# Patient Record
Sex: MALE | Race: WHITE | URBAN - METROPOLITAN AREA
[De-identification: names, ages, dates, MRNs, and addresses within clinical notes are randomized per-mention and may not be internally consistent; named-entity substitution may affect disease eponyms.]

---

## 2019-11-12 ENCOUNTER — TELEPHONE (OUTPATIENT)
Dept: NEUROLOGY | Facility: CLINIC | Age: 24
End: 2019-11-12

## 2024-07-05 ENCOUNTER — HOSPITAL ENCOUNTER (EMERGENCY)
Facility: HOSPITAL | Age: 29
Discharge: HOME | End: 2024-07-05
Attending: EMERGENCY MEDICINE
Payer: COMMERCIAL

## 2024-07-05 VITALS
HEART RATE: 67 BPM | TEMPERATURE: 98.7 F | OXYGEN SATURATION: 98 % | RESPIRATION RATE: 18 BRPM | DIASTOLIC BLOOD PRESSURE: 68 MMHG | SYSTOLIC BLOOD PRESSURE: 118 MMHG
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
## 2024-07-05 PROCEDURE — 99283 EMERGENCY DEPT VISIT LOW MDM: CPT

## 2024-07-05 PROCEDURE — 99284 EMERGENCY DEPT VISIT MOD MDM: CPT | Performed by: EMERGENCY MEDICINE

## 2024-07-05 RX ORDER — AMOXICILLIN AND CLAVULANATE POTASSIUM 875; 125 MG/1; MG/1
1 TABLET, FILM COATED ORAL EVERY 12 HOURS
Qty: 14 TABLET | Refills: 0 | Status: SHIPPED | OUTPATIENT
Start: 2024-07-05 | End: 2024-07-12

## 2024-07-05 RX ORDER — GINSENG 100 MG
1 CAPSULE ORAL ONCE
Status: COMPLETED | OUTPATIENT
Start: 2024-07-05 | End: 2024-07-05

## 2024-07-05 RX ORDER — GINSENG 100 MG
1 CAPSULE ORAL 2 TIMES DAILY
Qty: 28 G | Refills: 0 | Status: SHIPPED | OUTPATIENT
Start: 2024-07-05

## 2024-07-05 RX ADMIN — BACITRACIN 1 SMALL APPLICATION: 500 OINTMENT TOPICAL at 20:55

## 2024-07-06 NOTE — ED PROVIDER NOTES
History  Chief Complaint   Patient presents with    Dog Bite     Pt in way of dog bite. Laceration noted to left wrist and two puncture wounds to left hand     29-year-old male, presents with dog bites to left hand.  Patient was breaking up a fight between his and family members dog, was accidentally bit in the left hand.  Patient denies any other injuries, tetanus immunization up-to-date.  Dogs are up-to-date on their immunizations.      History provided by:  Patient   used: No    Dog Bite  Associated symptoms: no fever        None       History reviewed. No pertinent past medical history.    History reviewed. No pertinent surgical history.    History reviewed. No pertinent family history.  I have reviewed and agree with the history as documented.    E-Cigarette/Vaping     E-Cigarette/Vaping Substances     Social History     Tobacco Use    Smoking status: Never    Smokeless tobacco: Never   Substance Use Topics    Alcohol use: Yes     Comment: socially       Review of Systems   Constitutional: Negative.  Negative for fever.   Neurological: Negative.        Physical Exam  Physical Exam  Vitals and nursing note reviewed.   Constitutional:       General: He is not in acute distress.  Cardiovascular:      Rate and Rhythm: Normal rate.   Pulmonary:      Effort: Pulmonary effort is normal.   Musculoskeletal:         General: Normal range of motion.   Skin:     General: Skin is warm and dry.      Comments: Puncture wound with superficial laceration to left dorsal hands, another puncture wound to left dorsal hand, small puncture wound to left palm, hyperthenar eminence.  Full range of motion in wrist and other fingers.   Neurological:      General: No focal deficit present.      Mental Status: He is alert and oriented to person, place, and time.      Sensory: No sensory deficit.      Motor: No weakness.         Vital Signs  ED Triage Vitals [07/05/24 1952]   Temperature Pulse Respirations Blood Pressure  SpO2   98.7 °F (37.1 °C) 67 18 118/68 98 %      Temp Source Heart Rate Source Patient Position - Orthostatic VS BP Location FiO2 (%)   Oral Monitor Sitting Right arm --      Pain Score       --           Vitals:    07/05/24 1952   BP: 118/68   Pulse: 67   Patient Position - Orthostatic VS: Sitting         Visual Acuity      ED Medications  Medications   bacitracin topical ointment 1 small application (1 small application Topical Given 7/5/24 2055)       Diagnostic Studies  Results Reviewed       None                   No orders to display              Procedures  Procedures         ED Course                                             Medical Decision Making  29-year-old male, presenting with dog bites to left hand.  Differential diagnosis includes puncture wound, tendon injury, infection among other diagnoses.  No signs of acute infection, normal flexion and extension and sensation to light touch in all fingers.  Wounds thoroughly irrigated, dressed with bacitracin and bandaging.  Will place on oral Augmentin for prophylaxis.  Patient instructed to keep wounds clean, watch for signs of infection, return precautions given.    Risk  OTC drugs.  Prescription drug management.             Disposition  Final diagnoses:   Dog bite, initial encounter     Time reflects when diagnosis was documented in both MDM as applicable and the Disposition within this note       Time User Action Codes Description Comment    7/5/2024  8:47 PM Star Cardona Add [W54.0XXA] Dog bite, initial encounter           ED Disposition       ED Disposition   Discharge    Condition   Stable    Date/Time   Fri Jul 5, 2024  8:52 PM    Comment   Karl Mccartney discharge to home/self care.                   Follow-up Information    None         Patient's Medications   Discharge Prescriptions    AMOXICILLIN-CLAVULANATE (AUGMENTIN) 875-125 MG PER TABLET    Take 1 tablet by mouth every 12 (twelve) hours for 7 days       Start Date: 7/5/2024  End Date:  7/12/2024       Order Dose: 1 tablet       Quantity: 14 tablet    Refills: 0    BACITRACIN TOPICAL OINTMENT 500 UNITS/G TOPICAL OINTMENT    Apply 1 large application topically 2 (two) times a day       Start Date: 7/5/2024  End Date: --       Order Dose: 1 large application       Quantity: 28 g    Refills: 0       No discharge procedures on file.    PDMP Review       None            ED Provider  Electronically Signed by             Star Cardona MD  07/05/24 3115